# Patient Record
Sex: FEMALE | Race: WHITE | NOT HISPANIC OR LATINO | URBAN - METROPOLITAN AREA
[De-identification: names, ages, dates, MRNs, and addresses within clinical notes are randomized per-mention and may not be internally consistent; named-entity substitution may affect disease eponyms.]

---

## 2017-01-15 ENCOUNTER — COMMUNICATION - HEALTHEAST (OUTPATIENT)
Dept: FAMILY MEDICINE | Facility: CLINIC | Age: 42
End: 2017-01-15

## 2017-01-15 DIAGNOSIS — I10 UNSPECIFIED ESSENTIAL HYPERTENSION: ICD-10-CM

## 2017-04-22 ENCOUNTER — COMMUNICATION - HEALTHEAST (OUTPATIENT)
Dept: FAMILY MEDICINE | Facility: CLINIC | Age: 42
End: 2017-04-22

## 2017-04-22 DIAGNOSIS — I10 UNSPECIFIED ESSENTIAL HYPERTENSION: ICD-10-CM

## 2017-07-25 ENCOUNTER — COMMUNICATION - HEALTHEAST (OUTPATIENT)
Dept: FAMILY MEDICINE | Facility: CLINIC | Age: 42
End: 2017-07-25

## 2017-07-25 DIAGNOSIS — I10 UNSPECIFIED ESSENTIAL HYPERTENSION: ICD-10-CM

## 2017-09-21 ENCOUNTER — OFFICE VISIT - HEALTHEAST (OUTPATIENT)
Dept: ALLERGY | Facility: CLINIC | Age: 42
End: 2017-09-21

## 2017-09-21 DIAGNOSIS — R09.81 NASAL CONGESTION: ICD-10-CM

## 2017-09-25 ENCOUNTER — COMMUNICATION - HEALTHEAST (OUTPATIENT)
Dept: ALLERGY | Facility: CLINIC | Age: 42
End: 2017-09-25

## 2017-09-28 ENCOUNTER — RECORDS - HEALTHEAST (OUTPATIENT)
Dept: MAMMOGRAPHY | Facility: CLINIC | Age: 42
End: 2017-09-28

## 2017-09-28 ENCOUNTER — OFFICE VISIT - HEALTHEAST (OUTPATIENT)
Dept: FAMILY MEDICINE | Facility: CLINIC | Age: 42
End: 2017-09-28

## 2017-09-28 DIAGNOSIS — Z12.31 ENCOUNTER FOR SCREENING MAMMOGRAM FOR BREAST CANCER: ICD-10-CM

## 2017-09-28 DIAGNOSIS — Z12.31 ENCOUNTER FOR SCREENING MAMMOGRAM FOR MALIGNANT NEOPLASM OF BREAST: ICD-10-CM

## 2017-09-28 DIAGNOSIS — R10.11 RUQ PAIN: ICD-10-CM

## 2017-10-23 ENCOUNTER — COMMUNICATION - HEALTHEAST (OUTPATIENT)
Dept: FAMILY MEDICINE | Facility: CLINIC | Age: 42
End: 2017-10-23

## 2017-10-23 DIAGNOSIS — I10 ESSENTIAL HYPERTENSION: ICD-10-CM

## 2018-01-10 ENCOUNTER — RECORDS - HEALTHEAST (OUTPATIENT)
Dept: ADMINISTRATIVE | Facility: OTHER | Age: 43
End: 2018-01-10

## 2018-08-13 ENCOUNTER — COMMUNICATION - HEALTHEAST (OUTPATIENT)
Dept: FAMILY MEDICINE | Facility: CLINIC | Age: 43
End: 2018-08-13

## 2018-08-13 DIAGNOSIS — I10 ESSENTIAL HYPERTENSION: ICD-10-CM

## 2018-08-29 ENCOUNTER — OFFICE VISIT - HEALTHEAST (OUTPATIENT)
Dept: FAMILY MEDICINE | Facility: CLINIC | Age: 43
End: 2018-08-29

## 2018-08-29 DIAGNOSIS — Z71.84 TRAVEL ADVICE ENCOUNTER: ICD-10-CM

## 2018-08-29 DIAGNOSIS — Z00.00 ROUTINE GENERAL MEDICAL EXAMINATION AT A HEALTH CARE FACILITY: ICD-10-CM

## 2018-08-29 DIAGNOSIS — I10 ESSENTIAL HYPERTENSION: ICD-10-CM

## 2018-08-29 LAB
ALBUMIN SERPL-MCNC: 3.9 G/DL (ref 3.5–5)
ALP SERPL-CCNC: 45 U/L (ref 45–120)
ALT SERPL W P-5'-P-CCNC: 20 U/L (ref 0–45)
ANION GAP SERPL CALCULATED.3IONS-SCNC: 11 MMOL/L (ref 5–18)
AST SERPL W P-5'-P-CCNC: 16 U/L (ref 0–40)
BILIRUB SERPL-MCNC: 0.6 MG/DL (ref 0–1)
BUN SERPL-MCNC: 11 MG/DL (ref 8–22)
CALCIUM SERPL-MCNC: 9.5 MG/DL (ref 8.5–10.5)
CHLORIDE BLD-SCNC: 106 MMOL/L (ref 98–107)
CHOLEST SERPL-MCNC: 224 MG/DL
CLUE CELLS: NORMAL
CO2 SERPL-SCNC: 22 MMOL/L (ref 22–31)
CREAT SERPL-MCNC: 0.73 MG/DL (ref 0.6–1.1)
FASTING STATUS PATIENT QL REPORTED: YES
GFR SERPL CREATININE-BSD FRML MDRD: >60 ML/MIN/1.73M2
GLUCOSE BLD-MCNC: 100 MG/DL (ref 70–125)
HDLC SERPL-MCNC: 58 MG/DL
LDLC SERPL CALC-MCNC: 91 MG/DL
POTASSIUM BLD-SCNC: 4.3 MMOL/L (ref 3.5–5)
PROT SERPL-MCNC: 6.9 G/DL (ref 6–8)
SODIUM SERPL-SCNC: 139 MMOL/L (ref 136–145)
TRICHOMONAS, WET PREP: NORMAL
TRIGL SERPL-MCNC: 377 MG/DL
YEAST, WET PREP: NORMAL

## 2018-08-29 ASSESSMENT — MIFFLIN-ST. JEOR: SCORE: 1376.35

## 2018-08-30 LAB
C TRACH DNA SPEC QL PROBE+SIG AMP: NEGATIVE
N GONORRHOEA DNA SPEC QL NAA+PROBE: NEGATIVE

## 2018-09-04 LAB
BKR LAB AP ABNORMAL BLEEDING: NO
BKR LAB AP BIRTH CONTROL/HORMONES: NORMAL
BKR LAB AP CERVICAL APPEARANCE: NORMAL
BKR LAB AP GYN ADEQUACY: NORMAL
BKR LAB AP GYN INTERPRETATION: NORMAL
BKR LAB AP HPV REFLEX: NO
BKR LAB AP LMP: NORMAL
BKR LAB AP PATIENT STATUS: NORMAL
BKR LAB AP PREVIOUS ABNORMAL: NORMAL
BKR LAB AP PREVIOUS NORMAL: NORMAL
HIGH RISK?: NO
PATH REPORT.COMMENTS IMP SPEC: NORMAL
RESULT FLAG (HE HISTORICAL CONVERSION): NORMAL

## 2018-10-15 ENCOUNTER — COMMUNICATION - HEALTHEAST (OUTPATIENT)
Dept: SCHEDULING | Facility: CLINIC | Age: 43
End: 2018-10-15

## 2018-10-15 ENCOUNTER — RECORDS - HEALTHEAST (OUTPATIENT)
Dept: ADMINISTRATIVE | Facility: OTHER | Age: 43
End: 2018-10-15

## 2018-11-10 ENCOUNTER — COMMUNICATION - HEALTHEAST (OUTPATIENT)
Dept: FAMILY MEDICINE | Facility: CLINIC | Age: 43
End: 2018-11-10

## 2018-11-10 DIAGNOSIS — I10 ESSENTIAL HYPERTENSION: ICD-10-CM

## 2019-04-15 ENCOUNTER — OFFICE VISIT - HEALTHEAST (OUTPATIENT)
Dept: OTOLARYNGOLOGY | Facility: CLINIC | Age: 44
End: 2019-04-15

## 2019-04-15 ENCOUNTER — OFFICE VISIT - HEALTHEAST (OUTPATIENT)
Dept: AUDIOLOGY | Facility: CLINIC | Age: 44
End: 2019-04-15

## 2019-04-15 DIAGNOSIS — H90.3 BILATERAL SENSORINEURAL HEARING LOSS: ICD-10-CM

## 2019-04-15 DIAGNOSIS — H90.3 SENSORINEURAL HEARING LOSS, BILATERAL: ICD-10-CM

## 2019-04-15 DIAGNOSIS — H93.13 TINNITUS, BILATERAL: ICD-10-CM

## 2019-04-15 DIAGNOSIS — H93.13 TINNITUS OF BOTH EARS: ICD-10-CM

## 2019-09-20 ENCOUNTER — COMMUNICATION - HEALTHEAST (OUTPATIENT)
Dept: FAMILY MEDICINE | Facility: CLINIC | Age: 44
End: 2019-09-20

## 2019-10-15 ENCOUNTER — RECORDS - HEALTHEAST (OUTPATIENT)
Dept: MAMMOGRAPHY | Facility: CLINIC | Age: 44
End: 2019-10-15

## 2019-10-15 ENCOUNTER — OFFICE VISIT - HEALTHEAST (OUTPATIENT)
Dept: FAMILY MEDICINE | Facility: CLINIC | Age: 44
End: 2019-10-15

## 2019-10-15 DIAGNOSIS — Z00.00 ROUTINE GENERAL MEDICAL EXAMINATION AT A HEALTH CARE FACILITY: ICD-10-CM

## 2019-10-15 DIAGNOSIS — Z12.31 VISIT FOR SCREENING MAMMOGRAM: ICD-10-CM

## 2019-10-15 DIAGNOSIS — I10 ESSENTIAL HYPERTENSION: ICD-10-CM

## 2019-10-15 DIAGNOSIS — F17.200 TOBACCO USE DISORDER: ICD-10-CM

## 2019-10-15 DIAGNOSIS — Z12.31 ENCOUNTER FOR SCREENING MAMMOGRAM FOR MALIGNANT NEOPLASM OF BREAST: ICD-10-CM

## 2019-10-15 LAB
ALBUMIN SERPL-MCNC: 4.2 G/DL (ref 3.5–5)
ALP SERPL-CCNC: 58 U/L (ref 45–120)
ALT SERPL W P-5'-P-CCNC: 21 U/L (ref 0–45)
ANION GAP SERPL CALCULATED.3IONS-SCNC: 9 MMOL/L (ref 5–18)
AST SERPL W P-5'-P-CCNC: 17 U/L (ref 0–40)
BASOPHILS # BLD AUTO: 0.1 THOU/UL (ref 0–0.2)
BASOPHILS NFR BLD AUTO: 1 % (ref 0–2)
BILIRUB SERPL-MCNC: 0.7 MG/DL (ref 0–1)
BUN SERPL-MCNC: 14 MG/DL (ref 8–22)
CALCIUM SERPL-MCNC: 9.8 MG/DL (ref 8.5–10.5)
CHLORIDE BLD-SCNC: 105 MMOL/L (ref 98–107)
CHOLEST SERPL-MCNC: 210 MG/DL
CO2 SERPL-SCNC: 25 MMOL/L (ref 22–31)
CREAT SERPL-MCNC: 0.76 MG/DL (ref 0.6–1.1)
EOSINOPHIL # BLD AUTO: 0.3 THOU/UL (ref 0–0.4)
EOSINOPHIL NFR BLD AUTO: 3 % (ref 0–6)
ERYTHROCYTE [DISTWIDTH] IN BLOOD BY AUTOMATED COUNT: 11.3 % (ref 11–14.5)
FASTING STATUS PATIENT QL REPORTED: YES
GFR SERPL CREATININE-BSD FRML MDRD: >60 ML/MIN/1.73M2
GLUCOSE BLD-MCNC: 102 MG/DL (ref 70–125)
HCT VFR BLD AUTO: 43 % (ref 35–47)
HDLC SERPL-MCNC: 48 MG/DL
HGB BLD-MCNC: 15.1 G/DL (ref 12–16)
LDLC SERPL CALC-MCNC: 111 MG/DL
LYMPHOCYTES # BLD AUTO: 2.8 THOU/UL (ref 0.8–4.4)
LYMPHOCYTES NFR BLD AUTO: 25 % (ref 20–40)
MCH RBC QN AUTO: 32.1 PG (ref 27–34)
MCHC RBC AUTO-ENTMCNC: 35.2 G/DL (ref 32–36)
MCV RBC AUTO: 91 FL (ref 80–100)
MONOCYTES # BLD AUTO: 1 THOU/UL (ref 0–0.9)
MONOCYTES NFR BLD AUTO: 9 % (ref 2–10)
NEUTROPHILS # BLD AUTO: 7.1 THOU/UL (ref 2–7.7)
NEUTROPHILS NFR BLD AUTO: 63 % (ref 50–70)
PLATELET # BLD AUTO: 292 THOU/UL (ref 140–440)
PMV BLD AUTO: 6.9 FL (ref 7–10)
POTASSIUM BLD-SCNC: 4.2 MMOL/L (ref 3.5–5)
PROT SERPL-MCNC: 7.4 G/DL (ref 6–8)
RBC # BLD AUTO: 4.7 MILL/UL (ref 3.8–5.4)
SODIUM SERPL-SCNC: 139 MMOL/L (ref 136–145)
TRIGL SERPL-MCNC: 256 MG/DL
TSH SERPL DL<=0.005 MIU/L-ACNC: 1.55 UIU/ML (ref 0.3–5)
WBC: 11.3 THOU/UL (ref 4–11)

## 2019-10-15 ASSESSMENT — MIFFLIN-ST. JEOR: SCORE: 1363.02

## 2019-10-16 LAB
25(OH)D3 SERPL-MCNC: 18.9 NG/ML (ref 30–80)
25(OH)D3 SERPL-MCNC: 18.9 NG/ML (ref 30–80)

## 2019-12-26 ENCOUNTER — COMMUNICATION - HEALTHEAST (OUTPATIENT)
Dept: FAMILY MEDICINE | Facility: CLINIC | Age: 44
End: 2019-12-26

## 2019-12-26 DIAGNOSIS — I10 ESSENTIAL HYPERTENSION: ICD-10-CM

## 2020-12-16 ENCOUNTER — COMMUNICATION - HEALTHEAST (OUTPATIENT)
Dept: FAMILY MEDICINE | Facility: CLINIC | Age: 45
End: 2020-12-16

## 2020-12-16 DIAGNOSIS — I10 ESSENTIAL HYPERTENSION: ICD-10-CM

## 2020-12-22 ENCOUNTER — RECORDS - HEALTHEAST (OUTPATIENT)
Dept: MAMMOGRAPHY | Facility: CLINIC | Age: 45
End: 2020-12-22

## 2020-12-22 ENCOUNTER — OFFICE VISIT - HEALTHEAST (OUTPATIENT)
Dept: FAMILY MEDICINE | Facility: CLINIC | Age: 45
End: 2020-12-22

## 2020-12-22 DIAGNOSIS — I10 ESSENTIAL HYPERTENSION: ICD-10-CM

## 2020-12-22 DIAGNOSIS — Z12.31 ENCOUNTER FOR SCREENING MAMMOGRAM FOR MALIGNANT NEOPLASM OF BREAST: ICD-10-CM

## 2020-12-22 DIAGNOSIS — Z00.00 ROUTINE GENERAL MEDICAL EXAMINATION AT A HEALTH CARE FACILITY: ICD-10-CM

## 2020-12-22 DIAGNOSIS — Z12.31 VISIT FOR SCREENING MAMMOGRAM: ICD-10-CM

## 2020-12-22 LAB
ALBUMIN SERPL-MCNC: 4.1 G/DL (ref 3.5–5)
ALP SERPL-CCNC: 51 U/L (ref 45–120)
ALT SERPL W P-5'-P-CCNC: 16 U/L (ref 0–45)
ANION GAP SERPL CALCULATED.3IONS-SCNC: 11 MMOL/L (ref 5–18)
AST SERPL W P-5'-P-CCNC: 18 U/L (ref 0–40)
BILIRUB SERPL-MCNC: 0.4 MG/DL (ref 0–1)
BUN SERPL-MCNC: 13 MG/DL (ref 8–22)
CALCIUM SERPL-MCNC: 9.1 MG/DL (ref 8.5–10.5)
CHLORIDE BLD-SCNC: 107 MMOL/L (ref 98–107)
CHOLEST SERPL-MCNC: 209 MG/DL
CO2 SERPL-SCNC: 24 MMOL/L (ref 22–31)
CREAT SERPL-MCNC: 0.73 MG/DL (ref 0.6–1.1)
ERYTHROCYTE [DISTWIDTH] IN BLOOD BY AUTOMATED COUNT: 11 % (ref 11–14.5)
FASTING STATUS PATIENT QL REPORTED: YES
GFR SERPL CREATININE-BSD FRML MDRD: >60 ML/MIN/1.73M2
GLUCOSE BLD-MCNC: 94 MG/DL (ref 70–125)
HCT VFR BLD AUTO: 41.1 % (ref 35–47)
HDLC SERPL-MCNC: 50 MG/DL
HGB BLD-MCNC: 13.9 G/DL (ref 12–16)
LDLC SERPL CALC-MCNC: 119 MG/DL
MCH RBC QN AUTO: 31.4 PG (ref 27–34)
MCHC RBC AUTO-ENTMCNC: 33.8 G/DL (ref 32–36)
MCV RBC AUTO: 93 FL (ref 80–100)
PLATELET # BLD AUTO: 349 THOU/UL (ref 140–440)
PMV BLD AUTO: 6.9 FL (ref 7–10)
POTASSIUM BLD-SCNC: 4.4 MMOL/L (ref 3.5–5)
PROT SERPL-MCNC: 7.1 G/DL (ref 6–8)
RBC # BLD AUTO: 4.43 MILL/UL (ref 3.8–5.4)
SODIUM SERPL-SCNC: 142 MMOL/L (ref 136–145)
TRIGL SERPL-MCNC: 198 MG/DL
TSH SERPL DL<=0.005 MIU/L-ACNC: 1.52 UIU/ML (ref 0.3–5)
WBC: 8.6 THOU/UL (ref 4–11)

## 2020-12-22 RX ORDER — METOPROLOL SUCCINATE 50 MG/1
50 TABLET, EXTENDED RELEASE ORAL DAILY
Qty: 90 TABLET | Refills: 0 | Status: SHIPPED | OUTPATIENT
Start: 2020-12-22

## 2020-12-22 ASSESSMENT — MIFFLIN-ST. JEOR: SCORE: 1323.9

## 2021-01-20 ENCOUNTER — COMMUNICATION - HEALTHEAST (OUTPATIENT)
Dept: FAMILY MEDICINE | Facility: CLINIC | Age: 46
End: 2021-01-20

## 2021-01-21 ENCOUNTER — RECORDS - HEALTHEAST (OUTPATIENT)
Dept: ADMINISTRATIVE | Facility: OTHER | Age: 46
End: 2021-01-21

## 2021-05-27 NOTE — PROGRESS NOTES
HISTORY OF PRESENT ILLNESS  Patient comes in for evaluation of ringing in her ears. Going on for about 6 months. She feels that her hearing is decreased on one side. She doesn't remember which side is down. The ringing has been pretty constant. No pain. She had a URI last October that lasted for quite awhile and that may have been the start of the ringing. No history ear infection or surgery. She was diagnosed with ET dysfunction after several flights back in September.     REVIEW OF SYSTEMS  Review of Systems: a 10-system review was performed. Pertinent positives are noted in the HPI and on a separate scanned document in the chart.    PMH, PSH, FH and SH has documented in the EHR.      EXAM    CONSTITUTIONAL  General Appearance:  Normal, well developed, well nourished, no obvious distress  Ability to Communicate:  communicates appropriately.    HEAD AND FACE  Appearance and Symmetry:  Normal, no scalp or facial scarring or suspicious lesions.  Paranasal sinuses tenderness:  Normal, Paranasal sinuses non tender    EARS  Clinical speech reception threshold:  Normal, able to hear normal speech.  Auricle:  Normal, Auricles without scars, lesions, masses.  External auditory canal:  Normal, External auditory canal normal.  Tympanic membrane:  Normal, Tympanic membranes normal without swelling or erythema.  Tympanic membrane mobility:  Normal, Normal tympanic membrane mobility.    NOSE (speculum or scope)  Architecture:  Normal, Grossly normal external nasal architecture with no masses or lesions.  Mucosa:  Normal mucosa, No polyps or masses.  Septum:  Normal, Septum non-obstructing.  Turbinates:  Normal, No turbinate abnormalities    ORAL CAVITY AND OROPHARYNX  Lips:  Normal.  Dental and gingiva:  Normal, No obvious dental or gingival disease.  Mucosa:  Normal, Moist mucous membranes.  Tongue:  Normal, Tongue mobile with no mucosal abnormalities  Hard and soft palate:  Normal, Hard and soft palate without cleft or  mucosal lesions.  Oral pharynx:  Normal, Posterior pharynx without lesions or remarkable asymmetry.  Saliva:  Normal, Clear saliva.  Masses:  Normal, No palpable masses or pathologically enlarged lymph nodes.    NECK  Masses/lymph nodes:  Normal, No worrisome neck masses or lymph nodes.  Salivary glands:  Normal, Parotid and submandibular glands.  Trachea and larynx position:  Normal, Trachea and larynx midline.  Thyroid:  Normal, No thyroid abnormality.  Tenderness:  Normal, No cervical tenderness.  Suppleness:  Normal, Neck supple    NEUROLOGICAL  Speech pattern:  Normal, Proasaic    RESPIRATORY  Symmetry and Respiratory effort:  Normal, Symmetric chest movement and expansion with no increased intercostal retractions or use of accessory muscles.     HEARING TEST  Results of hearing test as documented in audiology notes which were reviewed.    IMPRESSION  Tinnitus in the setting of high tone sensorineural hearing loss.    RECOMMENDATION  Discussed tinnitus and possible causes. No cures or treatments. I provided reassurance and strategies. I also reiterated the need to protect her hearing.    Jules Dennis MD

## 2021-05-29 ENCOUNTER — RECORDS - HEALTHEAST (OUTPATIENT)
Dept: ADMINISTRATIVE | Facility: CLINIC | Age: 46
End: 2021-05-29

## 2021-05-31 ENCOUNTER — RECORDS - HEALTHEAST (OUTPATIENT)
Dept: ADMINISTRATIVE | Facility: CLINIC | Age: 46
End: 2021-05-31

## 2021-05-31 VITALS — BODY MASS INDEX: 27.64 KG/M2 | WEIGHT: 161 LBS

## 2021-06-01 ENCOUNTER — RECORDS - HEALTHEAST (OUTPATIENT)
Dept: ADMINISTRATIVE | Facility: CLINIC | Age: 46
End: 2021-06-01

## 2021-06-01 VITALS — WEIGHT: 163.44 LBS | BODY MASS INDEX: 27.9 KG/M2 | HEIGHT: 64 IN

## 2021-06-01 NOTE — TELEPHONE ENCOUNTER
Patient Returning Call  Reason for call:  Return call  Information relayed to patient:  Patient was informed of the message below. Patient had no further questions at this time. Patient was transferred to scheduling.  Patient has additional questions:  No  If YES, what are your questions/concerns:  n/a  Okay to leave a detailed message?: No call back needed

## 2021-06-01 NOTE — TELEPHONE ENCOUNTER
Quality Goal: Patient is due for a mammogram Screening and Blood pressure check. Please call the patient for an appointment with Dr. Sheppard. Thanks!      LVM to return call to clinic. Please schedule a nurse only BP check or appointment with Dr Sheppard.

## 2021-06-03 VITALS
DIASTOLIC BLOOD PRESSURE: 70 MMHG | HEIGHT: 64 IN | OXYGEN SATURATION: 97 % | BODY MASS INDEX: 27.4 KG/M2 | RESPIRATION RATE: 16 BRPM | TEMPERATURE: 98.3 F | WEIGHT: 160.5 LBS | HEART RATE: 77 BPM | SYSTOLIC BLOOD PRESSURE: 110 MMHG

## 2021-06-04 NOTE — TELEPHONE ENCOUNTER
Refill Approved    Rx renewed per Medication Renewal Policy. Medication was last renewed on 11/10/18.    Tyra Butler, [facility] Triage/Med Refill 12/27/2019     Requested Prescriptions   Pending Prescriptions Disp Refills     metoprolol succinate (TOPROL-XL) 50 MG 24 hr tablet 90 tablet 3     Sig: Take 1 tablet (50 mg total) by mouth daily.       Beta-Blockers Refill Protocol Passed - 12/26/2019  8:05 AM        Passed - PCP or prescribing provider visit in past 12 months or next 3 months     Last office visit with prescriber/PCP: 11/14/2016 Verito Pagan MD OR same dept: Visit date not found OR same specialty: 9/28/2017 Maritza Dias PA-C  Last physical: 10/15/2019 Last MTM visit: Visit date not found   Next visit within 3 mo: Visit date not found  Next physical within 3 mo: Visit date not found  Prescriber OR PCP: Verito Pagan MD  Last diagnosis associated with med order: 1. Essential hypertension  - metoprolol succinate (TOPROL-XL) 50 MG 24 hr tablet; Take 1 tablet (50 mg total) by mouth daily.  Dispense: 90 tablet; Refill: 3    If protocol passes may refill for 12 months if within 3 months of last provider visit (or a total of 15 months).             Passed - Blood pressure filed in past 12 months     BP Readings from Last 1 Encounters:   10/15/19 110/70

## 2021-06-05 VITALS
BODY MASS INDEX: 25.78 KG/M2 | SYSTOLIC BLOOD PRESSURE: 114 MMHG | HEIGHT: 64 IN | RESPIRATION RATE: 16 BRPM | DIASTOLIC BLOOD PRESSURE: 78 MMHG | WEIGHT: 151 LBS | HEART RATE: 72 BPM | TEMPERATURE: 97.6 F

## 2021-06-12 ENCOUNTER — HEALTH MAINTENANCE LETTER (OUTPATIENT)
Age: 46
End: 2021-06-12

## 2021-06-13 NOTE — PROGRESS NOTES
Assessment:    History suggestive of allergies.  Negative test today however there was a minimal positive histamine control.     Plan:    Recommend doing blood testing for environmental allergies  Recommend using Flonase 1 spray each nostril twice daily.  This can be done seasonally.  Reviewed proper technique  Antihistamines as needed  ____________________________________________________________________________     Radha is here today for allergy evaluation.  She describes rhinorrhea, sneezing, nasal congestion and coughing.  She describes itchy eyes.  She feels that she is always sick.  Her symptoms are worse in the spring and fall.  She uses over-the-counter Sudafed and Mucinex in the past.  She is also used DayQuil.  She does not use antihistamines.  She does not describe shortness of breath all though does have coughing.  No history of asthma or wheezing.    Review of symptoms:  As above, otherwise negative    Past medical history: Hypertension    Allergies: No known allergies to latex, foods or hymenoptera venom.  Penicillin allergy    Family history: Sister with allergies    Social history: Currently lives in a house was built in the 1970s.  She has been in this hospital 4-5 months.  It has forced air heat and central air conditioning.  No visible water seepage or mold.  She has had a cat in the home for 14 years.  Patient is a cigarette smoker.  She reports smoking 20 years approximately 6-8 cigarettes per day.    Medications: reviewed in chart  Physical Exam:  General:  Alert and Oriented X 3.  Eyes:  Sclera clear.  Ears: TMs translucent grey with bony landmarks visible. Nose: Pale, boggy mucosal membranes.  Throat: Pink, moist.  No lesions.  Neck: Supple.  No lymphadenopathy.  Lungs: CTA.  CV: Regular rate and rhythm. Extremities: Well perfused.  No clubbing or cyanosis. Skin: No rash    Last Percutaneous Allergy Test Results  Trees  Mihir, White  1:20 H  (W/F in mm): 0/0 (09/21/17 0959)  Birch Mix 1:20 H  (W/F in mm): 0/0 (09/21/17 0959)  [place], Common 1:20 H (W/F in mm): 0/0 (09/21/17 0959)  Elm, American 1:20 H (W/F in mm): 0/0 (09/21/17 0959)  [place], Shagbark 1:20 H (W/F in mm): 0/0 (09/21/17 0959)  Maple, Hard/Sugar 1:20 H (W/F in mm): 0/0 (09/21/17 0959)  [place] Mix 1:20 H (W/F in mm): 0/0 (09/21/17 0959)  [place], Red 1:20 H (W/F in mm): 0/0 (09/21/17 0959)  [place], American 1:20 H (W/F in mm): 0/0 (09/21/17 0959)  [place] Tree 1:20 H (W/F in mm): 0/0 (09/21/17 0959)  Dust Mites  D. Pteronyssinus Mite 30,000 AU/ML H (W/F in mm): 0/0 (09/21/17 0959)  D. Farinae Mite 30,000 AU/ML H (W/F in mm: 0/0 (09/21/17 0959)  Grasses  Grass Mix #4 10,000 BAU/ML H: 0/0 (09/21/17 0959)  Adria Grass 1:20 H (W/F in mm): 0/0 (09/21/17 0959)  Cockroach  Cockroach Mix 1:10 H (W/F in mm): 0/0 (09/21/17 0959)  Molds/Fungi  Alternaria Tenuis 1:10 H (W/F in mm): 0/0 (09/21/17 0959)  Aspergillus Fumigatus 1:10 H (W/F in mm): 0/0 (09/21/17 0959)  Homodendrum Cladosporioides 1:10 H (W/F in mm): 0/0 (09/21/17 0959)  Penicillin Notatum 1:10 H (W/F in mm): 0/0 (09/21/17 0959)  Epicoccum 1:10 H (W/F in mm): 0/0 (09/21/17 0959)  Weeds  Ragweed, Short 1:20 H (W/F in mm): 0/0 (09/21/17 0959)  Dock, [place] 1:20 H (W/F in mm): 0/0 (09/21/17 0959)  Lamb's Quarter 1:20 H (W/F in mm): 0/0 (09/21/17 0959)  Pigweed, Rough Red Root 1:20 H  (W/F in mm): 0/0 (09/21/17 0959)  Plantain, English 1:20 H  (W/F in mm): 0/0 (09/21/17 0959)  Sagebrush, Mugwort 1:20 H  (W/F in mm): 0/0 (09/21/17 0959)  Animal  Cat 10,000 BAU/ML H (W/F in mm): 0/0 (09/21/17 0959)  Dog 1:10 H (W/F in mm): 0/0 (09/21/17 0959)  Controls  Device Type: QUINTIP (09/21/17 0959)  Neg. control: 50% Glycerine/Saline H (W/F in mm): 0/0 (09/21/17 0959)  Pos. control: Histamine 6mg/ML (W/F in mms): 2/0 (09/21/17 0959)     This transcription uses voice recognition software, which may contain typographical errors.

## 2021-06-13 NOTE — PROGRESS NOTES
Subjective:    Radha Sharpe is a 42 y.o. female who presents for evaluation of ER follow-up for right upper quadrant pain.  She went to [facility] on 9/26/2017.  I reviewed the note, lab results, and diagnostic studies through Care Everywhere.  She presented to the ER with ongoing epigastric and right upper quadrant pain.  White blood cell count was minimally elevated, troponin, LFTs, lipase, BMP were all normal.  Grossly normal chest x-ray, right upper quadrant ultrasound was normal, negative pregnancy test.  UA had minimal blood present, but otherwise normal.  Normal EKG.  After monitoring, patient was eventually sent home.  She continued to have stomach pain and discomfort for the next day or so.  She felt quite bloated.  Yesterday she took 2 stool softeners and some Gas-X.  She then took a bath and laid under a heating pad.  She woke up last night at midnight and felt back to normal.  She continues to feel good today.  She does have some constipation.  She has not had a normal bowel movement in the past few days.  Previous abdominal surgery for ectopic pregnancy.    Patient Active Problem List   Diagnosis     Nicotine Dependence     Hearing Loss     Closed Trimalleolar Fracture     Red Blood In Bowel Movement (Hematochezia)     Endometriosis     Skin Lesion     Hypertension     Lump Or Mass In Breast       Current Outpatient Prescriptions:      metoprolol succinate (TOPROL-XL) 50 MG 24 hr tablet, TAKE 1 TABLET BY MOUTH EVERY DAY, Disp: 90 tablet, Rfl: 0     multivitamin (ONE A DAY) per tablet, , Disp: , Rfl:      Objective:   Allergies:  Penicillins    Vitals:  Vitals:    09/28/17 0937   BP: 90/60   Pulse: 80   Resp: 16   SpO2: 95%     Body mass index is 27.64 kg/(m^2).    Vital signs reviewed.  General: Patient is alert and oriented x 3, in no apparent distress  Cardiac: regular rate and rhythm, no murmurs  Pulmonary: lungs clear to auscultation bilaterally, no crackles, rales, rhonchi, or wheezing  noted  Abdomen: very mild pain with palpation in the suprapubic area, no hepatosplenomegaly, negative Burns's sign, no pain over McBurney's point, positive bowel sounds, no masses palpable  Skin: Skin in the abdominal area is healthy and normal in appearance    Assessment and Plan:   1.  Follow-up ER for right upper quadrant pain.  Pain is resolved completely as of 12 midnight last night.  I reviewed ER findings with patient.  There does not appear to be a clear cause for her pain.  I discussed options to treat if it recurs, such as an antacid medicine like omeprazole.  It is possible constipation could be playing a part in her symptoms.  I reviewed use of stool softener and/or MiraLAX.  She will increase fluids.  She is going to continue with a bland soft diet for the next few days.  She will keep us informed as to how she is doing.  All of her questions were answered today.    This dictation uses voice recognition software, which may contain typographical errors.

## 2021-06-13 NOTE — PROGRESS NOTES
Assessment:      Healthy female exam.    1. Routine general medical examination at a health care facility  2(CBC w/o Differential)   2. Essential hypertension  metoprolol succinate (TOPROL-XL) 50 MG 24 hr tablet    Comprehensive Metabolic Panel    Lipid Cascade FASTING    Thyroid Stimulating Hormone (TSH)   3. Visit for screening mammogram  Mammo Screening Bilateral          Plan:      This is a 46 yo female here for physical exam:  1.  Hypertension - blood pressure is well controlled now on current medications - will continue Metoprolol - check labs  2.  Health Maintenance -   Due for breast cancer screening - mammogram is ordered    Subjective:      Radha Sharpe is a 45 y.o. female who presents for an annual exam.  The patient reports that there is not domestic violence in her life.     Working from home  Lost weight - not necessarily trying - but feeling well -   Stopped drinking 2 years ago     Worries about diabetes (strong family history)  No family history of colon disease  Mammogram - today  Pap smear - due next year (no HPV screening in 2018, neg in 2016)    Healthy Habits:   Regular Exercise: Yes and walks dog twice daily; treadmill  Sunscreen Use: irregular  Healthy Diet: eating fairly healthy  Dental Visits Regularly: Yes  Seat Belt: Yes  Sexually active: Yes  Self Breast Exam Monthly:irregular        Immunization History   Administered Date(s) Administered     DT (pediatric) 01/01/2001     Hep A, historic 11/28/2007, 10/05/2009     Hep B, historic 10/04/2013, 11/19/2013, 04/07/2014     IPV 11/28/2007     Influenza M2d9-50, 12/28/2009     Influenza, inj, historic,unspecified 11/03/2008, 09/12/2013, 10/02/2014     Influenza, seasonal,quad inj 6-35 mos 10/05/2009, 10/20/2011     MMR 12/04/2009, 12/28/2009     Td,adult,historic,unspecified 11/28/2007     Tdap 11/28/2007, 12/15/2015     Typhoid Live, Oral 10/17/2015     Immunization status: reviewed  .    No exam data present    Gynecologic  History  Patient's last menstrual period was 2020 (exact date).  Contraception: none  Last Pap:  (pap/HPV), 2018 (pap). Results were: normal - recommend re-pap/HPV in   Last mammogram: 2019 Results were: normal      OB History    Para Term  AB Living   0 0 0 0 0 0   SAB TAB Ectopic Multiple Live Births   0 0 0 0         Current Outpatient Medications   Medication Sig Dispense Refill     metoprolol succinate (TOPROL-XL) 50 MG 24 hr tablet Take 1 tablet (50 mg total) by mouth daily. Needs visit prior to future refills. 90 tablet 0     multivitamin (ONE A DAY) per tablet        No current facility-administered medications for this visit.      Past Medical History:   Diagnosis Date     Closed trimalleolar fracture     Created by [facility] Annotation: 2012  8:21AM - Verito Pagan:  occurred during roller derby; managed by Dr. Meek Schneider, [facility]      Past Surgical History:   Procedure Laterality Date     ANKLE FRACTURE SURGERY      Open Treatment Of Trimalleolar Ankle Fracture     DILATION AND CURETTAGE OF UTERUS       ECTOPIC PREGNANCY SURGERY       FOOT FOREIGN BODY REMOVAL      wood from a deck     Penicillins  Family History   Problem Relation Age of Onset     Cancer Father 63        Prostate     Breast cancer Paternal Grandmother 55        Pancreatic and breast     Cancer Paternal Grandmother 55        Pancreatic     Social History     Socioeconomic History     Marital status:      Spouse name: Not on file     Number of children: Not on file     Years of education: Not on file     Highest education level: Not on file   Occupational History     Occupation: marketing -      Employer: [employer]   Social Needs     Financial resource strain: Not on file     Food insecurity     Worry: Not on file     Inability: Not on file     Transportation needs     Medical: Not on file     Non-medical: Not on file   Tobacco Use     Smoking status: Current Every  "Day Smoker     Packs/day: 0.50     Types: Cigarettes     Smokeless tobacco: Never Used     Tobacco comment: 4-6 cigarettes per day   Substance and Sexual Activity     Alcohol use: Yes     Comment: occasional     Drug use: No     Sexual activity: Yes     Partners: Male   Lifestyle     Physical activity     Days per week: Not on file     Minutes per session: Not on file     Stress: Not on file   Relationships     Social connections     Talks on phone: Not on file     Gets together: Not on file     Attends Congregational service: Not on file     Active member of club or organization: Not on file     Attends meetings of clubs or organizations: Not on file     Relationship status: Not on file     Intimate partner violence     Fear of current or ex partner: Not on file     Emotionally abused: Not on file     Physically abused: Not on file     Forced sexual activity: Not on file   Other Topics Concern     Not on file   Social History Narrative    Lives with ; his 2 kids               Review of Systems  Review of Systems     Pertinent positives as noted in HPI; otherwise 12 point ROS negative.          Objective:         Vitals:    12/22/20 0747   BP: 114/78   Pulse: 72   Resp: 16   Temp: 97.6  F (36.4  C)   TempSrc: Temporal   Weight: 151 lb (68.5 kg)   Height: 5' 4.25\" (1.632 m)     Body mass index is 25.72 kg/m .    Physical  Physical Exam    EXAM:  /78 (Patient Site: Right Arm, Patient Position: Sitting, Cuff Size: Adult Regular)   Pulse 72   Temp 97.6  F (36.4  C) (Temporal)   Resp 16   Ht 5' 4.25\" (1.632 m)   Wt 151 lb (68.5 kg)   LMP 12/16/2020 (Exact Date)   BMI 25.72 kg/m     Gen:  NAD, appears well, well-hydrated  HEENT:  TMs nl, oropharynx benign, nasal mucosa nl, conjunctiva clear  Neck:  Supple, no adenopathy, no thyromegaly  Lungs:  Clear to auscultation bilaterally  Breast exam:  No breast lumps, no skin changes, no nipple discharge, no axillary adenopathy  Cor:  RRR no murmur  Abd:  Soft, " nontender, BS+, no masses, no guarding or rebound, no HSM  Extr:  Neg.  Neuro:  No asymmetry, Nl motor tone/strength, nl sensation, reflexes =, gait nl, nl coordination, CN intact,   Skin:  Warm/dry

## 2021-06-14 NOTE — TELEPHONE ENCOUNTER
Reason for Call:  Other call back      Detailed comments: pt is calling only to speak to dr carbajal. Her ins is no longer covering this clinic and she feels so bad and asked if she could please talk to her if only for a moment.    Phone Number Patient can be reached at: Home number on file [phone] (home)    Best Time: anytime    Can we leave a detailed message on this number?: No she really wants to talk to dr carbajal    Call taken on 1/20/2021 at 8:56 AM by Anastasia Gutierrez

## 2021-06-14 NOTE — TELEPHONE ENCOUNTER
"Called patient - she came in December 2020 for physical exam - got charged for the visit because I was an \"out of network provider\" - verified with her insurance company - our clinic is apparently \"out of network\" now.  She needs to change due to financial reasons.  She wanted me to know that she wasn't upset with me or felt like I didn't do the job, she was ONLY transferring due to insurance.  We discussed this - I have wished her well (and reassured her I understand those kinds of financial decisions).    "

## 2021-06-20 NOTE — PROGRESS NOTES
"Assessment:      Healthy female exam.    1. Routine general medical examination at a health care facility  Gynecologic Cytology (PAP Smear)    Wet Prep, Vaginal    Chlamydia trachomatis & Neisseria gonorrhoeae, Amplified Detection   2. Travel advice encounter  ciprofloxacin HCl (CIPRO) 500 MG tablet    [place] November 10-17, 2018   3. Hypertension  Comprehensive Metabolic Panel    Lipid Profile          Plan:      This is a 44 yo female here for physical exam.   1.  Health Maintenance - due for pap smear; will do infection screening with wet prep, GC/Chlam.  2.  Travel advice encounter - will be travelling to [place] in November - reviewed travel advice; vaccines are up to date.  Refilled Cipro for travel use.  3.  Hypertension - blood pressure is elevated today - would ask patient to recheck blood pressures as an outpatient.  Will check labs today.      Subjective:      Radha Sharpe is a 43 y.o. female who presents for an annual exam.The patient reports that there is not domestic violence in her life.     Going to [place] -   Service trip  \"they\" told her that she just needs tetanus - probably up to date  (review of chart 2015)      Healthy Habits:   Regular Exercise: Yes  Sunscreen Use: Yes  Healthy Diet: Yes  Dental Visits Regularly: Yes  Seat Belt: Yes  Sexually active: Yes  Self Breast Exam Monthly:Yes        Immunization History   Administered Date(s) Administered     DT (pediatric) 01/01/2001     Hep A, historic 11/28/2007, 10/05/2009     Hep B, historic 10/04/2013, 11/19/2013, 04/07/2014     IPV 11/28/2007     Influenza [id number], 12/28/2009     Influenza, inj, historic,unspecified 11/03/2008, 09/12/2013, 10/02/2014     Influenza, seasonal,quad inj 6-35 mos 10/05/2009, 10/20/2011     MMR 12/04/2009, 12/28/2009     Td,adult,historic,unspecified 11/28/2007     Tdap 11/28/2007, 12/15/2015     Typhoid Live, Oral 10/17/2015     Immunization status: reviewed.    No exam data present    Gynecologic " History  Patient's last menstrual period was 2018 (exact date).  Contraception: none  Last Pap: . Results were: normal  Last mammogram: 2017. Results were: normal      OB History    Para Term  AB Living   0 0 0 0 0 0   SAB TAB Ectopic Multiple Live Births   0 0 0 0              Current Outpatient Prescriptions   Medication Sig Dispense Refill     metoprolol succinate (TOPROL-XL) 50 MG 24 hr tablet TAKE 1 TABLET BY MOUTH DAILY. 90 tablet 0     multivitamin (ONE A DAY) per tablet        ciprofloxacin HCl (CIPRO) 500 MG tablet Take 1 tablet (500 mg total) by mouth 2 (two) times a day for 7 days. 14 tablet 0     No current facility-administered medications for this visit.      History reviewed. No pertinent past medical history.  Past Surgical History:   Procedure Laterality Date     ANKLE FRACTURE SURGERY      Open Treatment Of Trimalleolar Ankle Fracture     DILATION AND CURETTAGE OF UTERUS       ECTOPIC PREGNANCY SURGERY       FOOT FOREIGN BODY REMOVAL      wood from a deck     Penicillins  Family History   Problem Relation Age of Onset     Cancer Father 63     Prostate     Breast cancer Paternal Grandmother 55     Pancreatic and breast     Cancer Paternal Grandmother 55     Pancreatic     Social History     Social History     Marital status:      Spouse name: N/A     Number of children: N/A     Years of education: N/A     Occupational History     marketing -  [employer]     Social History Main Topics     Smoking status: Current Every Day Smoker     Types: Cigarettes     Smokeless tobacco: Never Used      Comment: 4-6 cigarettes per day     Alcohol use Yes      Comment: occasional     Drug use: No     Sexual activity: Yes     Partners: Male     Other Topics Concern     Not on file     Social History Narrative    Lives with ; his 2 kids               Review of Systems  Review of Systems     Pertinent positives as noted in HPI; otherwise 12 point ROS negative.        Objective:        "  Vitals:    08/29/18 0930   BP: 120/90   Pulse: 80   Resp: 20   Temp: 98.1  F (36.7  C)   TempSrc: Oral   SpO2: 97%   Weight: 163 lb 7 oz (74.1 kg)   Height: 5' 4\" (1.626 m)     Body mass index is 28.05 kg/(m^2).    Physical  Physical Exam    EXAM:  /90 (Patient Site: Right Arm, Patient Position: Sitting, Cuff Size: Adult Large)  Pulse 80  Temp 98.1  F (36.7  C) (Oral)   Resp 20  Ht 5' 4\" (1.626 m)  Wt 163 lb 7 oz (74.1 kg)  LMP 08/11/2018 (Exact Date)  SpO2 97%  Breastfeeding? No  BMI 28.05 kg/m2   Gen:  NAD, appears well, well-hydrated  HEENT:  TMs nl, oropharynx benign, nasal mucosa nl, conjunctiva clear  Neck:  Supple, no adenopathy, no thyromegaly, no carotid bruits, no JVD  Lungs:  Clear to auscultation bilaterally  Cor:  RRR no murmur  Abd:  Soft, nontender, BS+, no masses, no guarding or rebound, no HSM  PELVIC EXAM:External genitalia: normal  Vaginal mucosa normal  Vaginal discharge: white  Speculum exam shows a normal appearing cervix .   Bimanual exam: Cervix closed, firm, non tender  to motion.  Uterus  firm, regular, mobile, non tender to palpation. No adnexal masses or tenderness.   Extr:  Neg.  Neuro:  No asymmetry, Nl motor tone/strength, nl sensation, reflexes =, gait nl, nl coordination, CN intact,   Skin:  Warm/dry        "

## 2021-06-27 NOTE — PROGRESS NOTES
"Progress Notes by Lauren Gonzalez AuD at 4/15/2019  9:00 AM     Author: Lauren Gonzalez AuD Service: -- Author Type: Audiologist    Filed: 4/15/2019 11:22 AM Encounter Date: 4/15/2019 Status: Signed    : Lauren Gonzalez AuD (Audiologist)       Audiology Report    Referring Provider:  Dr. Dennis    Summary: Audiology visit completed. Please see audiogram below or in \"media\" tab for case history and results.      Transducer: Circumaural headphones    Reliability was good  and there was good  SRT to PTA agreement.       Plan:  The patient is returned to ENT for follow up.  She should return for retesting with ENT recommendation.      Tani Singer, MARINA-A  [place] Licensed Audiologist #[id number]                    "

## 2021-06-28 NOTE — PROGRESS NOTES
"Progress Notes by Verito Pagan MD at 10/15/2019  8:00 AM     Author: Verito Pagan MD Service: -- Author Type: Physician    Filed: 10/20/2019  9:24 PM Encounter Date: 10/15/2019 Status: Signed    : Verito Pagan MD (Physician)       FEMALE PREVENTATIVE EXAM    Assessment and Plan:       1. Routine general medical examination at a health care facility  HM1(CBC and Differential)    Vitamin D, Total (25-Hydroxy)    Thyroid Stimulating Hormone (TSH)    1 (CBC with Diff)   2. Hypertension  Lipid Profile    Comprehensive Metabolic Panel   3. Nicotine Dependence     4. Visit for screening mammogram       This is a 43 yo female here for physical exam; no specific new concerns addressed today.  1.  Health Maintenance - will check labs today -   Had influenza vaccine on 10/2/19 at [employer] (her workplace); due for breast cancer screening - mammogram ordered today  2.  Hypertension - still taking Metoprolol - doing well  - continue same; recheck labs.  3.  Nicotine dependence - discussed - not ready to quit  I have counseled the patient for tobacco cessation and the follow up will occur  at the next visit.         Next follow up:  Return in about 6 months (around 4/15/2020) for BP Check.    Immunization Review  Adult Imm Review: reviewed  discussed smoking cessation - not ready    I discussed the following with the patient:   Adult Healthy Living: Importance of regular exercise  Healthy nutrition  Getting adequate sleep  Stress management    I have had an Advance Directives discussion with the patient.    Subjective:   Chief Complaint: Radha Sharpe is an 44 y.o. female here for a preventative health visit.     HPI:      Life is \"cruising\"  Was in [place] - [place] [employer] [place]; before that, in [facility]      Still at [employer] - full time    Walks dog every night -   Ankle is \"okay\" - sometimes locks up in morning   Not painful, more stiff  Getting a little worse    Still on BP " "meds     8-10 cigarettes/day  Always thinks about quitting        Healthy Habits  Are you taking a daily aspirin? No  Do you typically exercising at least 40 min, 3-4 times per week?  Yes  Do you usually eat at least 4 servings of fruit and vegetables a day, include whole grains and fiber and avoid regularly eating high fat foods? NO  Have you had an eye exam in the past two years? Yes  Do you see a dentist twice per year? Yes  Do you have any concerns regarding sleep? No    Safety Screen  If you own firearms, are they secured in a locked gun cabinet or with trigger locks? Yes  Do you feel you are safe where you are living?: Yes (10/15/2019  7:56 AM)  Do you feel you are safe in your relationship(s)?: Yes (10/15/2019  7:56 AM)      Review of Systems:  Please see above.  The rest of the review of systems are negative for all systems.     Pap History:   not due for pap smear - just done 2018  Cancer Screening       Status Date      MAMMOGRAM Next Due 10/15/2020      Done 10/15/2019 MAMMO SCREENING BILATERAL     Patient has more history with this topic...    PAP SMEAR Next Due 8/29/2023      Done 8/29/2018 GYNECOLOGIC CYTOLOGY (PAP SMEAR)     Patient has more history with this topic...          Patient Care Team:  Verito Pagan MD as PCP - General  Verito Pagan MD as Assigned PCP        History     Reviewed By Date/Time Sections Reviewed    Verito Pagan MD 10/15/2019  8:20 AM Medical, Surgical, Tobacco, Alcohol, Drug Use, Sexual Activity, Family, Social Documentation    Georgina Gould CMA 10/15/2019  8:00 AM Tobacco            Objective:   Vital Signs:   Visit Vitals  /70 (Patient Site: Left Arm, Patient Position: Sitting, Cuff Size: Adult Regular)   Pulse 77   Temp 98.3  F (36.8  C) (Oral)   Resp 16   Ht 5' 4\" (1.626 m)   Wt 160 lb 8 oz (72.8 kg)   LMP 09/23/2019   SpO2 97%   Breastfeeding? No   BMI 27.55 kg/m           PHYSICAL EXAM  EXAM:  /70 (Patient " "Site: Left Arm, Patient Position: Sitting, Cuff Size: Adult Regular)   Pulse 77   Temp 98.3  F (36.8  C) (Oral)   Resp 16   Ht 5' 4\" (1.626 m)   Wt 160 lb 8 oz (72.8 kg)   LMP 09/23/2019   SpO2 97%   Breastfeeding? No   BMI 27.55 kg/m     Gen:  NAD, appears well, well-hydrated  HEENT:  TMs nl, oropharynx benign, nasal mucosa nl, conjunctiva clear  Neck:  Supple, no adenopathy, no thyromegaly, no carotid bruits, no JVD  Lungs:  Clear to auscultation bilaterally  Breast exam:  No breast lumps, no skin changes, no nipple discharge, no axillary adenopathy  Cor:  RRR no murmur  Abd:  Soft, nontender, BS+, no masses, no guarding or rebound, no HSM  Extr:  Neg., no significant bone/joint deformity; scars healed at right ankle, some stiffness in ROM at right ankle  Neuro:  No asymmetry, Nl motor tone/strength, nl sensation, reflexes =, gait nl, nl coordination, CN intact,   Skin:  Warm/dry        The 10-year ASCVD risk score ([place] MARICEL Jr., et al., 2013) is: 3.5%    Values used to calculate the score:      Age: 44 years      Sex: Female      Is Non- : No      Diabetic: No      Tobacco smoker: Yes      Systolic Blood Pressure: 110 mmHg      Is BP treated: Yes      HDL Cholesterol: 48 mg/dL      Total Cholesterol: 210 mg/dL         Medication List          Accurate as of October 15, 2019 11:59 PM. If you have any questions, ask your nurse or doctor.            CONTINUE taking these medications    metoprolol succinate 50 MG 24 hr tablet  Also known as:  TOPROL-XL  INSTRUCTIONS:  TAKE 1 TABLET BY MOUTH DAILY.        multivitamin per tablet  Also known as:  ONE A DAY               Additional Screenings Completed Today:          "

## 2021-07-22 ENCOUNTER — RECORDS - HEALTHEAST (OUTPATIENT)
Dept: FAMILY MEDICINE | Facility: CLINIC | Age: 46
End: 2021-07-22

## 2021-07-22 DIAGNOSIS — Z12.31 OTHER SCREENING MAMMOGRAM: ICD-10-CM

## 2021-07-23 ENCOUNTER — RECORDS - HEALTHEAST (OUTPATIENT)
Dept: LAB | Facility: CLINIC | Age: 46
End: 2021-07-23

## 2021-07-23 DIAGNOSIS — N63.0 LUMP OR MASS IN BREAST: ICD-10-CM

## 2021-07-23 DIAGNOSIS — R92.8 ABNORMAL MAMMOGRAM: ICD-10-CM

## 2021-09-26 ENCOUNTER — HEALTH MAINTENANCE LETTER (OUTPATIENT)
Age: 46
End: 2021-09-26

## 2022-03-13 ENCOUNTER — HEALTH MAINTENANCE LETTER (OUTPATIENT)
Age: 47
End: 2022-03-13

## 2023-01-14 ENCOUNTER — HEALTH MAINTENANCE LETTER (OUTPATIENT)
Age: 48
End: 2023-01-14

## 2023-04-23 ENCOUNTER — HEALTH MAINTENANCE LETTER (OUTPATIENT)
Age: 48
End: 2023-04-23